# Patient Record
Sex: MALE | Race: WHITE | HISPANIC OR LATINO | Employment: UNEMPLOYED | URBAN - METROPOLITAN AREA
[De-identification: names, ages, dates, MRNs, and addresses within clinical notes are randomized per-mention and may not be internally consistent; named-entity substitution may affect disease eponyms.]

---

## 2022-12-22 ENCOUNTER — OFFICE VISIT (OUTPATIENT)
Dept: SURGERY | Facility: CLINIC | Age: 47
End: 2022-12-22

## 2022-12-22 VITALS — HEIGHT: 71 IN | WEIGHT: 192 LBS | BODY MASS INDEX: 26.88 KG/M2

## 2022-12-22 DIAGNOSIS — K60.2 ANAL FISSURE: Primary | ICD-10-CM

## 2022-12-22 PROCEDURE — 99203 OFFICE O/P NEW LOW 30 MIN: CPT | Performed by: SURGERY

## 2022-12-22 RX ORDER — METHOCARBAMOL 500 MG/1
500 TABLET, FILM COATED ORAL 3 TIMES DAILY
COMMUNITY
Start: 2022-12-08 | End: 2023-02-24

## 2022-12-22 RX ORDER — NAPROXEN 500 MG/1
500 TABLET ORAL 2 TIMES DAILY WITH MEALS
COMMUNITY
Start: 2022-12-08 | End: 2023-02-24

## 2022-12-22 RX ORDER — LISINOPRIL 10 MG/1
10 TABLET ORAL DAILY
COMMUNITY
Start: 2022-12-08

## 2022-12-22 RX ORDER — AMOXICILLIN 250 MG
2 CAPSULE ORAL DAILY
Qty: 90 TABLET | Refills: 2 | Status: SHIPPED | OUTPATIENT
Start: 2022-12-22 | End: 2023-02-24

## 2022-12-22 NOTE — PATIENT INSTRUCTIONS
A high fiber diet with plenty of fluids (up to 8 glasses of water daily) is suggested to relieve these symptoms.  Metamucil, 1 tablespoon once or twice daily can be used to keep bowels regular if needed.       High-Fiber Diet  Fiber, also called dietary fiber, is a type of carbohydrate found in fruits, vegetables, whole grains, and beans. A high-fiber diet can have many health benefits. Your health care provider may recommend a high-fiber diet to help:  Prevent constipation. Fiber can make your bowel movements more regular.  Lower your cholesterol.  Relieve hemorrhoids, uncomplicated diverticulosis, or irritable bowel syndrome.  Prevent overeating as part of a weight-loss plan.  Prevent heart disease, type 2 diabetes, and certain cancers.    WHAT IS MY PLAN?  The recommended daily intake of fiber includes:  38 grams for men under age 50.  30 grams for men over age 50.  25 grams for women under age 50.  21 grams for women over age 50.  You can get the recommended daily intake of dietary fiber by eating a variety of fruits, vegetables, grains, and beans. Your health care provider may also recommend a fiber supplement if it is not possible to get enough fiber through your diet.    WHAT DO I NEED TO KNOW ABOUT A HIGH-FIBER DIET?  Fiber supplements have not been widely studied for their effectiveness, so it is better to get fiber through food sources.  Always check the fiber content on the nutrition facts label of any prepackaged food. Look for foods that contain at least 5 grams of fiber per serving.  Ask your dietitian if you have questions about specific foods that are related to your condition, especially if those foods are not listed in the following section.  Increase your daily fiber consumption gradually. Increasing your intake of dietary fiber too quickly may cause bloating, cramping, or gas.  Drink plenty of water. Water helps you to digest fiber.    WHAT FOODS CAN I EAT?  Grains  Whole-grain breads. Multigrain  cereal. Oats and oatmeal. Brown rice. Barley. Bulgur wheat. Millet. Bran muffins. Popcorn. Rye wafer crackers.  Vegetables  Sweet potatoes. Spinach. Kale. Artichokes. Cabbage. Broccoli. Green peas. Carrots. Squash.  Fruits  Berries. Pears. Apples. Oranges. Avocados. Prunes and raisins. Dried figs.  Meats and Other Protein Sources  Navy, kidney, torres, and soy beans. Split peas. Lentils. Nuts and seeds.  Dairy  Fiber-fortified yogurt.  Beverages  Fiber-fortified soy milk. Fiber-fortified orange juice.  Other  Fiber bars.  The items listed above may not be a complete list of recommended foods or beverages. Contact your dietitian for more options.  WHAT FOODS ARE NOT RECOMMENDED?  Grains  White bread. Pasta made with refined flour. White rice.  Vegetables  Fried potatoes. Canned vegetables. Well-cooked vegetables.   Fruits  Fruit juice. Cooked, strained fruit.  Meats and Other Protein Sources  Fatty cuts of meat. Fried poultry or fried fish.  Dairy  Milk. Yogurt. Cream cheese. Sour cream.  Beverages  Soft drinks.  Other  Cakes and pastries. Butter and oils.  The items listed above may not be a complete list of foods and beverages to avoid. Contact your dietitian for more information.    WHAT ARE SOME TIPS FOR INCLUDING HIGH-FIBER FOODS IN MY DIET?  Eat a wide variety of high-fiber foods.  Make sure that half of all grains consumed each day are whole grains.  Replace breads and cereals made from refined flour or white flour with whole-grain breads and cereals.  Replace white rice with brown rice, bulgur wheat, or millet.  Start the day with a breakfast that is high in fiber, such as a cereal that contains at least 5 grams of fiber per serving.  Use beans in place of meat in soups, salads, or pasta.  Eat high-fiber snacks, such as berries, raw vegetables, nuts, or popcorn.

## 2022-12-23 NOTE — PROGRESS NOTES
Chief Complaint   Patient presents with   • Rectal Bleeding     With rectal pain       Subjective     Bryan Taveras is a 47 y.o. male here for evaluation of blood in stool referred by JOSE Traore.  Patient reports rectal bleeding that has been going on for the last 2 months.  He reports bright red blood per rectum that happen after every bowel movement.  This has been associated with pain.  Pain happens during bowel movements and after.  The pain is sharp and intense.  Pain last for several minutes.  Patient reports having 2 bowel movements on a daily basis.  He reports pain started after being constipated for 3 days.  Patient has history of constipation as well as anal fissure.  He reports having some type of surgery in his anus for anal fissure.  Patient had a colonoscopy in 2010.  No family history of colon cancer    Past Medical History:   Diagnosis Date   • Hypertension        Past Surgical History:   Procedure Laterality Date   • COLONOSCOPY N/A 2010    done in [place]   - Left lateral sphincterotomy?? 2010      Current Outpatient Medications:   •  lisinopril (PRINIVIL,ZESTRIL) 10 MG tablet, Take 10 mg by mouth Daily., Disp: , Rfl:   •  methocarbamol (ROBAXIN) 500 MG tablet, Take 500 mg by mouth 3 (Three) Times a Day., Disp: , Rfl:   •  naproxen (NAPROSYN) 500 MG tablet, Take 500 mg by mouth 2 (Two) Times a Day With Meals., Disp: , Rfl:   •  sennosides-docusate (senna-docusate sodium) 8.6-50 MG per tablet, Take 2 tablets by mouth Daily., Disp: 90 tablet, Rfl: 2    No Known Allergies    History reviewed. No pertinent family history.    Social History     Socioeconomic History   • Marital status: Single   Tobacco Use   • Smoking status: Never   • Smokeless tobacco: Never   Vaping Use   • Vaping Use: Never used   Substance and Sexual Activity   • Alcohol use: Never   • Drug use: Never   • Sexual activity: Defer       REVIEW OF SYSTEMS    CONSTITUTIONAL: Denies fevers, chills, unintentional  "weight loss or weight gain  RESPIRATORY: Denies chronic cough or sob.   CARDIAC: Denies chest pain, palpitations, edema  GI: Denies dyspepsia, reflux, heartburn, nausea, vomiting, diarrhea or constipation  : Denies dysuria or hematuria.    MUSCULOSKELETAL: Denies muscle weakness and pain   NEURO: Denies chronic headaches.   ENDOCRINE: Denies significant heat or cold intolerance or history of thyroid problems.    DERM: no rashes,lesions or discharge.     Physical Examination  Ht 180.3 cm (71\")   Wt 87.1 kg (192 lb)   BMI 26.78 kg/m²   Body mass index is 26.78 kg/m².  GENERAL:alert, well appearing, and in no distress and oriented to person, place, and time  HEENT: normochephalic, atraumatic, no scleral icterus moist mucous membranes.  NECK: Supple   CHEST: Breathing comfortable  CARDIAC: regular rate and rhythm    ABDOMEN: soft, nontender, nondistended, no masses or organomegaly  EXTREMITIES: no cyanosis, clubbing or edema    NEURO: alert and oriented, normal speech, cranial nerves 2-12 grossly intact, no focal deficits   SKIN: Moist, warm, no rashes.  Perianal exam: There is posterior anal fissure without sentinel pile.  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  Discussed with the patient about treatment options.  Recommend that he start bowel regimen with stool softeners and Metamucil 1 tablespoon daily.  Increase water intake.  I started him on diltiazem and lidocaine cream to be applied 3 times at the to the perianal area.  Hopefully the fissure will heal with conservative management, otherwise he may need to have Botox injection versus left lateral sphincterotomy.  We will need to have a colonoscopy when fissure is healed    1.  Follow-up in my office in 6 weeks    He understood       Toño Sewell MD  General, Minimally Invasive and Endoscopic Surgery  [facility]    [address], [address]  [place], [place], " [address]  P: [phone]  F: [phone]

## 2023-02-24 ENCOUNTER — OFFICE VISIT (OUTPATIENT)
Dept: SURGERY | Facility: CLINIC | Age: 48
End: 2023-02-24
Payer: MEDICAID

## 2023-02-24 VITALS — WEIGHT: 194 LBS | HEIGHT: 71 IN | BODY MASS INDEX: 27.16 KG/M2

## 2023-02-24 DIAGNOSIS — K60.2 PERIANAL FISSURE: Primary | ICD-10-CM

## 2023-02-24 PROCEDURE — 99213 OFFICE O/P EST LOW 20 MIN: CPT | Performed by: SURGERY

## 2023-02-24 RX ORDER — AMOXICILLIN AND CLAVULANATE POTASSIUM 875; 125 MG/1; MG/1
1 TABLET, FILM COATED ORAL 2 TIMES DAILY
Qty: 14 TABLET | Refills: 0 | Status: SHIPPED | OUTPATIENT
Start: 2023-02-24 | End: 2023-03-14

## 2023-02-24 NOTE — PATIENT INSTRUCTIONS
A high fiber diet with plenty of fluids (up to 8 glasses of water daily) is suggested to relieve these symptoms.  Metamucil, 1 tablespoon once or twice daily can be used to keep bowels regular if needed.

## 2023-02-25 NOTE — PROGRESS NOTES
"Cc: Follow-up rectal bleeding and anal fissure    S: Patient is here today for follow-up after being diagnosed with perianal fissure.  Patient did treatment with diltiazem and lidocaine cream.  He reports significantly improvement of his symptoms and no more pain or bleeding.  He has noted a small amount of purulent discharge from the anus.  He has not been taking fiber supplementation or has not staying a bowel regimen since he was diagnosed.   Last colonoscopy 2010    Past Medical History:   Diagnosis Date   • Hypertension    -Anal fissure  -Rectal bleeding    Past Surgical History:   Procedure Laterality Date   • COLONOSCOPY N/A 2010    done in [place]     Meds: Reviewed and reconciled in epic    Allergies: No known drug allergy    Social and family history: Noncontributory    ROS:   Constitutional: denies any weight changes, fatigue or weakness.  Eyes: : denies blurred or double vision  Cardiovascular: denies chest pain, palpitations, edemas.  Respiratory: denies cough, sputum, SOB.  Gastrointestinal: denies N&V, abd pain, diarrhea, constipation.  Genitourinary: denies dysuria, frequency.  Endocrine: denies cold intolerance, lethargy and flushing.  Hem: denies excessive bruising and postop bleeding.  Musculoskeletal: denies weakness, joint swelling, pain or stiffness.  Neuro: denies seizures, CVA, paresthesia, or peripheral neuropathy.   Skin: denies change in nevi, rashes, masses.    Physical exam:   Vitals:    02/24/23 0934   Weight: 88 kg (194 lb)   Height: 180.3 cm (71\")     Alert, no acute distress  Breathing comfort  Regular rate rhythm  Over the posterior perianal area there is anal fissure.  The small amount of purulent fluid.  There is no abscess or signs of infection.  The area was referred    Assessment and plan    47-year-old male with posterior perianal fissure.  Symptoms are significantly better.  Not been having any rectal bleeding.  The fistulous opening still opening.  He has not been on a bowel " regimen.  Recommend that he continues to be on stool softeners and fiber supplementation to allow better healing of his fissure.  Recommend that he continues with diltiazem lidocaine cream as previously prescribed.  He should perform sitz bath after every bowel movement.  I recommend that he takes 7 days of Augmentin just for the findings of purulent fluid at the perianal area.  I think it is unlikely that he has a fistula but I would like to see what happened when he comes back in 2 weeks.    Patient verbalized understanding and agree with the plan     Toño Sewell MD, FACS  General, Minimally Invasive and Endoscopic Surgery  [facility]    [address], [address]  [place], [place], [address]  P: [phone]  F: [phone]

## 2023-03-14 ENCOUNTER — OFFICE VISIT (OUTPATIENT)
Dept: SURGERY | Facility: CLINIC | Age: 48
End: 2023-03-14
Payer: MEDICAID

## 2023-03-14 VITALS — HEIGHT: 71 IN | BODY MASS INDEX: 27.16 KG/M2 | WEIGHT: 194 LBS

## 2023-03-14 DIAGNOSIS — R19.8 ANAL DISCHARGE: Primary | ICD-10-CM

## 2023-03-14 PROCEDURE — 99213 OFFICE O/P EST LOW 20 MIN: CPT | Performed by: SURGERY

## 2023-03-14 PROCEDURE — 1160F RVW MEDS BY RX/DR IN RCRD: CPT | Performed by: SURGERY

## 2023-03-14 PROCEDURE — 1159F MED LIST DOCD IN RCRD: CPT | Performed by: SURGERY

## 2023-03-14 RX ORDER — NAPROXEN 500 MG/1
500 TABLET ORAL 2 TIMES DAILY WITH MEALS
COMMUNITY
Start: 2023-03-02

## 2023-03-14 NOTE — PROGRESS NOTES
"Cc: Follow-up rectal bleeding and anal fissure     S: Patient is here today for follow-up after being diagnosed with perianal fissure.  Patient was noted to have small amount of purulent drainage after the last visit.  He was placed on antibiotics.  He still reports intermittent episodes small amount of purulent drainage.  He denies any pain or rectal bleeding.  He has been having regular bowel function.    Past Medical History:   Diagnosis Date   • Hypertension        -Anal fissure  -Rectal bleeding     Surgical History         Past Surgical History:   Procedure Laterality Date   • COLONOSCOPY N/A 2010     done in [place]         Meds: Reviewed and reconciled in epic     Allergies: No known drug allergy     Social and family history: Noncontributory     ROS:   Constitutional: denies any weight changes, fatigue or weakness.  Eyes: : denies blurred or double vision  Cardiovascular: denies chest pain, palpitations, edemas.  Respiratory: denies cough, sputum, SOB.  Gastrointestinal: denies N&V, abd pain, diarrhea, constipation.  Genitourinary: denies dysuria, frequency.  Endocrine: denies cold intolerance, lethargy and flushing.  Hem: denies excessive bruising and postop bleeding.  Musculoskeletal: denies weakness, joint swelling, pain or stiffness.  Neuro: denies seizures, CVA, paresthesia, or peripheral neuropathy.   Skin: denies change in nevi, rashes, masses.    Physical exam:   Vitals:    03/14/23 1353   Weight: 88 kg (194 lb)   Height: 180.3 cm (71\")     Alert, no acute distress  Breathing comfort  Regular rate rhythm  Over the posterior perianal area there is anal fissure.  The small amount of purulent fluid.  There is no abscess or signs of infection.     Assessment and plan     47-year-old male with posterior perianal fissure.  Symptoms are completely resolved but he continues to have small amount of purulent fluid from the anal area.  I think he may have a small fistula versus abscess.  He is totally " asymptomatic from it.  Discussed with the patient about the need to get CT scan of the pelvis for her further characterization.  I think neck step for him will be to undergo rectal exam under anesthesia and colonoscopy.  We will wait for the results of the CT scan and then discuss about colonoscopy.  For now continue bowel regimen and high-fiber supplementation    He understood

## 2023-04-05 ENCOUNTER — APPOINTMENT (OUTPATIENT)
Dept: OTHER | Facility: HOSPITAL | Age: 48
End: 2023-04-05
Payer: MEDICAID

## 2023-04-05 ENCOUNTER — HOSPITAL ENCOUNTER (OUTPATIENT)
Dept: CT IMAGING | Facility: HOSPITAL | Age: 48
Discharge: HOME | End: 2023-04-05
Payer: MEDICAID

## 2023-04-05 DIAGNOSIS — R19.8 ANAL DISCHARGE: ICD-10-CM

## 2023-04-05 DIAGNOSIS — Z09 FOLLOW-UP EXAM: ICD-10-CM

## 2023-04-05 PROCEDURE — 72193 CT PELVIS W/DYE: CPT

## 2023-04-05 PROCEDURE — 25510000001 IOPAMIDOL 61 % SOLUTION: Performed by: SURGERY

## 2023-04-05 RX ADMIN — IOPAMIDOL 85 ML: 612 INJECTION, SOLUTION INTRAVENOUS at 08:49

## 2023-04-20 DIAGNOSIS — Z12.11 SCREENING FOR COLON CANCER: Primary | ICD-10-CM

## 2023-05-23 ENCOUNTER — ANESTHESIA (OUTPATIENT)
Dept: GASTROENTEROLOGY | Facility: HOSPITAL | Age: 48
End: 2023-05-23
Payer: MEDICAID

## 2023-05-23 ENCOUNTER — HOSPITAL ENCOUNTER (OUTPATIENT)
Facility: HOSPITAL | Age: 48
Setting detail: HOSPITAL OUTPATIENT SURGERY
Discharge: HOME | End: 2023-05-23
Attending: SURGERY | Admitting: SURGERY
Payer: MEDICAID

## 2023-05-23 ENCOUNTER — ANESTHESIA EVENT (OUTPATIENT)
Dept: GASTROENTEROLOGY | Facility: HOSPITAL | Age: 48
End: 2023-05-23
Payer: MEDICAID

## 2023-05-23 VITALS
OXYGEN SATURATION: 100 % | HEIGHT: 71 IN | RESPIRATION RATE: 16 BRPM | SYSTOLIC BLOOD PRESSURE: 105 MMHG | BODY MASS INDEX: 26.47 KG/M2 | HEART RATE: 89 BPM | DIASTOLIC BLOOD PRESSURE: 82 MMHG | WEIGHT: 189.1 LBS | TEMPERATURE: 97.5 F

## 2023-05-23 DIAGNOSIS — Z12.11 SCREENING FOR COLON CANCER: ICD-10-CM

## 2023-05-23 PROBLEM — K60.2 ANAL FISSURE: Status: ACTIVE | Noted: 2023-05-23

## 2023-05-23 PROCEDURE — 88305 TISSUE EXAM BY PATHOLOGIST: CPT | Performed by: SURGERY

## 2023-05-23 PROCEDURE — 25010000002 PROPOFOL 10 MG/ML EMULSION: Performed by: NURSE ANESTHETIST, CERTIFIED REGISTERED

## 2023-05-23 RX ORDER — SODIUM CHLORIDE, SODIUM LACTATE, POTASSIUM CHLORIDE, CALCIUM CHLORIDE 600; 310; 30; 20 MG/100ML; MG/100ML; MG/100ML; MG/100ML
30 INJECTION, SOLUTION INTRAVENOUS CONTINUOUS PRN
Status: DISCONTINUED | OUTPATIENT
Start: 2023-05-23 | End: 2023-05-23 | Stop reason: HOSPADM

## 2023-05-23 RX ORDER — PROPOFOL 10 MG/ML
VIAL (ML) INTRAVENOUS AS NEEDED
Status: DISCONTINUED | OUTPATIENT
Start: 2023-05-23 | End: 2023-05-23 | Stop reason: SURG

## 2023-05-23 RX ORDER — LIDOCAINE HYDROCHLORIDE 20 MG/ML
INJECTION, SOLUTION INFILTRATION; PERINEURAL AS NEEDED
Status: DISCONTINUED | OUTPATIENT
Start: 2023-05-23 | End: 2023-05-23 | Stop reason: SURG

## 2023-05-23 RX ORDER — PROPOFOL 10 MG/ML
VIAL (ML) INTRAVENOUS CONTINUOUS PRN
Status: DISCONTINUED | OUTPATIENT
Start: 2023-05-23 | End: 2023-05-23 | Stop reason: SURG

## 2023-05-23 RX ADMIN — LIDOCAINE HYDROCHLORIDE 60 MG: 20 INJECTION, SOLUTION INFILTRATION; PERINEURAL at 09:28

## 2023-05-23 RX ADMIN — Medication 100 MG: at 09:29

## 2023-05-23 RX ADMIN — PROPOFOL 180 MCG/KG/MIN: 10 INJECTION, EMULSION INTRAVENOUS at 09:28

## 2023-05-23 RX ADMIN — SODIUM CHLORIDE, POTASSIUM CHLORIDE, SODIUM LACTATE AND CALCIUM CHLORIDE 30 ML/HR: 600; 310; 30; 20 INJECTION, SOLUTION INTRAVENOUS at 09:06

## 2023-05-23 NOTE — ANESTHESIA POSTPROCEDURE EVALUATION
Patient: Bryan Haddad    Procedure Summary     Date: 05/23/23 [facility]    Anesthesia Start: 0922 Anesthesia Stop: 0952    Procedure: COLONOSCOPY INTO CECUM AND TI WITH BIOPSIES Diagnosis:       Screening for colon cancer      (Screening for colon cancer [Z12.11])    Surgeons: Toño Sewell MD Provider: Manuel Pena MD    Anesthesia Type: MAC ASA Status: 2          Anesthesia Type: MAC    Vitals  Vitals Value Taken Time   BP 92/58 05/23/23 0951   Temp     Pulse 98 05/23/23 0951   Resp 16 05/23/23 0951   SpO2 97 % 05/23/23 0951           Post Anesthesia Care and Evaluation    Patient location during evaluation: PACU  Patient participation: complete - patient participated  Level of consciousness: awake and alert  Pain management: adequate    Airway patency: patent  Anesthetic complications: No anesthetic complications    Cardiovascular status: acceptable  Respiratory status: acceptable  Hydration status: acceptable    Comments: --------------------            05/23/23 0951     --------------------   BP:       92/58      Pulse:      98       Resp:       16       Temp:                SpO2:      97%      --------------------

## 2023-05-23 NOTE — H&P
"Reason for Visit: Rectal bleeding, perianal fistula    HPI:  47-year-old male with history of pain and drainage of rectal bleeding.  He underwent CT scan of the abdomen and pelvis that showed no evidence of fistulous communication.        Past Medical History:   Diagnosis Date   • Hypertension        Past Surgical History:   Procedure Laterality Date   • COLONOSCOPY N/A 2010    done in [place]         Current Facility-Administered Medications:   •  lactated ringers infusion, 30 mL/hr, Intravenous, Continuous PRN, Toño Sewell MD, Last Rate: 30 mL/hr at 05/23/23 0922, Currently Infusing at 05/23/23 0922    Facility-Administered Medications Ordered in Other Encounters:   •  lidocaine (XYLOCAINE) 2% injection, , Intravenous, PRN, Dowland, Catarina M, CRNA, 60 mg at 05/23/23 0928  •  Propofol (DIPRIVAN) injection, , Intravenous, Continuous PRN, Dowland, Catarina M, CRNA, Last Rate: 92.664 mL/hr at 05/23/23 0928, 180 mcg/kg/min at 05/23/23 0928  •  Propofol (DIPRIVAN) injection, , Intravenous, PRN, Dowland, Catarina M, CRNA, 100 mg at 05/23/23 0929    No Known Allergies    History reviewed. No pertinent family history.    Social History     Socioeconomic History   • Marital status: Single   Tobacco Use   • Smoking status: Never   • Smokeless tobacco: Never   Vaping Use   • Vaping Use: Never used   Substance and Sexual Activity   • Alcohol use: Never   • Drug use: Never   • Sexual activity: Defer        Review Of Systems:  A comprehensive review of systems was negative.    Objective:   Physical exam:  /84 (BP Location: Left arm, Patient Position: Lying)   Pulse 89   Temp 97.5 °F (36.4 °C) (Oral)   Resp 16   Ht 180.3 cm (71\")   Wt 85.8 kg (189 lb 1.6 oz)   SpO2 99%   BMI 26.37 kg/m²     General Appearance:  awake, alert, oriented, in no acute distress  Lungs:  Breathing Pattern: regular, no distress  Heart: Regular rate rhythm  Abdomen:  Soft, non-tender, normal bowel sounds; no bruits, organomegaly or " masses.    Assessment:    Bryan Haddad is a 47 y.o. male who has history of perianal fissure, possible fistula and rectal bleeding.  Discussed with the patient about treatment options and recommend that he undergoes colonoscopy for further evaluation.  He understood      The rationale for colonoscopy with possible biopsy, possible polypectomy, with IV sedation as well as all risks, benefits, and alternatives were discussed with the patient in detail. Risks including but not limited to perforation, bleeding,need for blood transfusion or emergent surgery ,and missed neoplasm were reviewed in detail with the patient The patient demonstrates full understanding and wishes to proceed with the colonoscopy.

## 2023-05-23 NOTE — ANESTHESIA POSTPROCEDURE EVALUATION
Patient: Bryan Haddad    Procedure Summary     Date: 05/23/23 [facility]    Anesthesia Start: 0922 Anesthesia Stop: 0952    Procedure: COLONOSCOPY INTO CECUM AND TI WITH BIOPSIES Diagnosis:       Screening for colon cancer      (Screening for colon cancer [Z12.11])    Surgeons: Toño Sewell MD Provider: Manuel Pena MD    Anesthesia Type: MAC ASA Status: 2          Anesthesia Type: MAC    Vitals  Vitals Value Taken Time   BP 92/58 05/23/23 0951   Temp     Pulse 98 05/23/23 0951   Resp 16 05/23/23 0951   SpO2 97 % 05/23/23 0951           Post Anesthesia Care and Evaluation    Patient location during evaluation: PACU  Patient participation: complete - patient participated  Level of consciousness: awake and alert  Pain management: adequate    Airway patency: patent  Anesthetic complications: No anesthetic complications    Cardiovascular status: acceptable  Respiratory status: acceptable  Hydration status: acceptable    Comments: --------------------            05/23/23 0951     --------------------   BP:       92/58      Pulse:      98       Resp:       16       Temp:                SpO2:      97%      --------------------

## 2023-05-23 NOTE — ANESTHESIA PREPROCEDURE EVALUATION
" Anesthesia Evaluation     Patient summary reviewed and Nursing notes reviewed                Airway   Mallampati: II  TM distance: <3 FB  Neck ROM: full  Dental      Pulmonary - negative pulmonary ROS   Cardiovascular     Rhythm: regular  Rate: normal    (+) hypertension,       Neuro/Psych- negative ROS  GI/Hepatic/Renal/Endo - negative ROS     Musculoskeletal (-) negative ROS    Abdominal    Substance History - negative use     OB/GYN negative ob/gyn ROS         Other                        Anesthesia Plan    ASA 2     MAC     (HTN treated  Faroese primary language  Computer data says \"single\" however patient's wife present today  Both speak Sammarinese primarily   Grew up in [place]    I have reviewed the patient's history with the patient and the chart, including all pertinent laboratory results and imaging. I have explained the risks of anesthesia including but not limited to dental damage, corneal abrasion, nerve injury, MI, stroke, and death. Questions asked and answered. Anesthetic plan discussed with patient and team as indicated. Patient expressed understanding of the above.  )  intravenous induction     Anesthetic plan, risks, benefits, and alternatives have been provided, discussed and informed consent has been obtained with: patient.    Plan discussed with CRNA.        CODE STATUS:       "

## 2023-05-24 LAB
LAB AP CASE REPORT: NORMAL
PATH REPORT.FINAL DX SPEC: NORMAL
PATH REPORT.GROSS SPEC: NORMAL

## 2023-05-25 ENCOUNTER — TELEPHONE (OUTPATIENT)
Dept: SURGERY | Facility: CLINIC | Age: 48
End: 2023-05-25
Payer: MEDICAID

## 2023-05-25 NOTE — TELEPHONE ENCOUNTER
Called the patient and left him message regarding his benign findings on colonoscopy.  Patient to follow-up in my office in 2 to 4 weeks.    Colonoscopy in 10 years for screening

## 2023-06-30 PROBLEM — K60.3 ANAL FISTULA: Status: ACTIVE | Noted: 2023-06-30

## 2023-07-10 PROBLEM — K60.3 ANAL FISTULA: Status: RESOLVED | Noted: 2023-06-30 | Resolved: 2023-07-10

## 2023-07-27 ENCOUNTER — OFFICE VISIT (OUTPATIENT)
Dept: SURGERY | Facility: CLINIC | Age: 48
End: 2023-07-27
Payer: MEDICAID

## 2023-07-27 VITALS
SYSTOLIC BLOOD PRESSURE: 132 MMHG | WEIGHT: 191 LBS | BODY MASS INDEX: 26.74 KG/M2 | HEIGHT: 71 IN | DIASTOLIC BLOOD PRESSURE: 68 MMHG

## 2023-07-27 DIAGNOSIS — Z09 POSTOP CHECK: Primary | ICD-10-CM

## 2023-07-27 PROCEDURE — 1159F MED LIST DOCD IN RCRD: CPT | Performed by: SURGERY

## 2023-07-27 PROCEDURE — 1160F RVW MEDS BY RX/DR IN RCRD: CPT | Performed by: SURGERY

## 2023-07-27 PROCEDURE — 99024 POSTOP FOLLOW-UP VISIT: CPT | Performed by: SURGERY

## 2023-07-27 NOTE — PROGRESS NOTES
"Cc: Postoperative follow-up    S: Patient is here today for follow-up after undergoing perianal fistulotomy on 7/10/2023.  Patient is doing great and denies any complaint.  Denies any drainage.  Has been performing sitz bath 3 times daily    O:   Vitals:    07/27/23 1001   BP: 132/68   BP Location: Left arm   Patient Position: Sitting   Weight: 86.6 kg (191 lb)   Height: 180.3 cm (71\")      Posterior perianal wound healing well, no drainage    Assessment and plan    48-year-old male status post posterior perianal fistulotomy.  No issues from surgery.  Healing well    He will follow-up in my office in 2 weeks  Continue sitz bath as needed  "

## 2023-08-15 ENCOUNTER — OFFICE VISIT (OUTPATIENT)
Dept: SURGERY | Facility: CLINIC | Age: 48
End: 2023-08-15
Payer: MEDICAID

## 2023-08-15 VITALS — OXYGEN SATURATION: 99 % | SYSTOLIC BLOOD PRESSURE: 128 MMHG | DIASTOLIC BLOOD PRESSURE: 88 MMHG | HEART RATE: 89 BPM

## 2023-08-15 DIAGNOSIS — Z09 POSTOP CHECK: ICD-10-CM

## 2023-08-15 DIAGNOSIS — Z09 ENCOUNTER FOR FOLLOW-UP: Primary | ICD-10-CM

## 2023-08-15 PROCEDURE — 1159F MED LIST DOCD IN RCRD: CPT | Performed by: SURGERY

## 2023-08-15 PROCEDURE — 99024 POSTOP FOLLOW-UP VISIT: CPT | Performed by: SURGERY

## 2023-08-15 PROCEDURE — 1160F RVW MEDS BY RX/DR IN RCRD: CPT | Performed by: SURGERY

## 2023-08-16 NOTE — PROGRESS NOTES
Cc: Postoperative follow-up     S: Patient is here today for follow-up after undergoing perianal fistulotomy on 7/10/2023.  Patient is doing great and denies any complaint.  Denies any drainage.  Has been performing sitz bath 3 times daily    O:   Vitals:    08/15/23 1324   BP: 128/88   BP Location: Left arm   Patient Position: Sitting   Cuff Size: Adult   Pulse: 89   SpO2: 99%      Alert, no acute distress  Breathing comfortable  Procedure and wound that is superficial and is healing  There is no signs of infection    Assessment and plan    48-year-old male status post posterior perianal fistulotomy.  Doing great without any complaint  Follow-up in my office in a month for recheck  Stool softeners as needed, fiber supplementation    He understood

## 2023-09-15 ENCOUNTER — OFFICE VISIT (OUTPATIENT)
Dept: SURGERY | Facility: CLINIC | Age: 48
End: 2023-09-15
Payer: MEDICAID

## 2023-09-15 VITALS — HEIGHT: 71 IN | BODY MASS INDEX: 26.91 KG/M2 | WEIGHT: 192.2 LBS

## 2023-09-15 DIAGNOSIS — Z51.89 VISIT FOR WOUND CHECK: Primary | ICD-10-CM

## 2023-09-15 PROCEDURE — 1159F MED LIST DOCD IN RCRD: CPT | Performed by: SURGERY

## 2023-09-15 PROCEDURE — 99024 POSTOP FOLLOW-UP VISIT: CPT | Performed by: SURGERY

## 2023-09-15 PROCEDURE — 1160F RVW MEDS BY RX/DR IN RCRD: CPT | Performed by: SURGERY

## 2023-09-15 NOTE — PROGRESS NOTES
"CC: Follow-up after posterior perianal fistulotomy    S: Patient is here today for follow-up.  He is doing extremely well after he underwent posterior perianal fistulotomy on 7/10/2023.  He denies any complaint.  He has been having regular bowel movements.  Denies any bleeding.    O:   Vitals:    09/15/23 0955   Weight: 87.2 kg (192 lb 3.2 oz)   Height: 180.3 cm (70.98\")      Alert, no acute distress  Breathing comfortable  Perianal examination show a very small open wound on the posterior perianal area there is almost completely healed    Assessment and plan    48-year-old male status post posterior perianal fistulotomy.  His symptoms are completely resolved.  He has small amount of nonhealing wound over the posterior aspect.  Discussed with him about the need to continue bowel regimen fiber supplementation. Discussed with him that the wound will completely heal with time.  Patient to follow-up in my office as needed or if symptoms recur.    He understood  "

## (undated) DEVICE — KT ORCA ORCAPOD DISP STRL

## (undated) DEVICE — SENSR O2 OXIMAX FNGR A/ 18IN NONSTR

## (undated) DEVICE — TUBING, SUCTION, 1/4" X 10', STRAIGHT: Brand: MEDLINE

## (undated) DEVICE — ADAPT CLN BIOGUARD AIR/H2O DISP

## (undated) DEVICE — SINGLE-USE BIOPSY FORCEPS: Brand: RADIAL JAW 4

## (undated) DEVICE — CANN O2 ETCO2 FITS ALL CONN CO2 SMPL A/ 7IN DISP LF

## (undated) DEVICE — LN SMPL CO2 SHTRM SD STREAM W/M LUER